# Patient Record
Sex: FEMALE | Race: WHITE | URBAN - METROPOLITAN AREA
[De-identification: names, ages, dates, MRNs, and addresses within clinical notes are randomized per-mention and may not be internally consistent; named-entity substitution may affect disease eponyms.]

---

## 2017-05-15 ENCOUNTER — OFFICE VISIT (OUTPATIENT)
Dept: OBGYN | Facility: CLINIC | Age: 22
End: 2017-05-15
Payer: COMMERCIAL

## 2017-05-15 VITALS
BODY MASS INDEX: 18.37 KG/M2 | HEIGHT: 69 IN | DIASTOLIC BLOOD PRESSURE: 60 MMHG | SYSTOLIC BLOOD PRESSURE: 106 MMHG | WEIGHT: 124 LBS

## 2017-05-15 DIAGNOSIS — N94.6 DYSMENORRHEA: ICD-10-CM

## 2017-05-15 DIAGNOSIS — N92.6 IRREGULAR MENSES: Primary | ICD-10-CM

## 2017-05-15 PROCEDURE — 99202 OFFICE O/P NEW SF 15 MIN: CPT | Performed by: OBSTETRICS & GYNECOLOGY

## 2017-05-15 RX ORDER — NORETHINDRONE ACETATE AND ETHINYL ESTRADIOL 1MG-20(21)
1 KIT ORAL DAILY
Qty: 84 TABLET | Refills: 3 | Status: SHIPPED | OUTPATIENT
Start: 2017-05-15 | End: 2017-11-22

## 2017-05-15 ASSESSMENT — ANXIETY QUESTIONNAIRES
6. BECOMING EASILY ANNOYED OR IRRITABLE: NOT AT ALL
7. FEELING AFRAID AS IF SOMETHING AWFUL MIGHT HAPPEN: NOT AT ALL
GAD7 TOTAL SCORE: 0
2. NOT BEING ABLE TO STOP OR CONTROL WORRYING: NOT AT ALL
3. WORRYING TOO MUCH ABOUT DIFFERENT THINGS: NOT AT ALL
1. FEELING NERVOUS, ANXIOUS, OR ON EDGE: NOT AT ALL
5. BEING SO RESTLESS THAT IT IS HARD TO SIT STILL: NOT AT ALL
IF YOU CHECKED OFF ANY PROBLEMS ON THIS QUESTIONNAIRE, HOW DIFFICULT HAVE THESE PROBLEMS MADE IT FOR YOU TO DO YOUR WORK, TAKE CARE OF THINGS AT HOME, OR GET ALONG WITH OTHER PEOPLE: NOT DIFFICULT AT ALL

## 2017-05-15 ASSESSMENT — PATIENT HEALTH QUESTIONNAIRE - PHQ9: 5. POOR APPETITE OR OVEREATING: NOT AT ALL

## 2017-05-15 NOTE — MR AVS SNAPSHOT
"              After Visit Summary   5/15/2017    Maude Han    MRN: [id number]           Patient Information     Date Of Birth          1995        Visit Information        Provider Department      5/15/2017 9:00 AM Deysi Montoya MD [facility]        Today's Diagnoses     Irregular menses    -  1    Dysmenorrhea           Follow-ups after your visit        Who to contact     If you have questions or need follow up information about today's clinic visit or your schedule please contact [facility] directly at [phone].  Normal or non-critical lab and imaging results will be communicated to you by [employer], letter or phone within 4 business days after the clinic has received the results. If you do not hear from us within 7 days, please contact the clinic through Dreamzer Gamest or phone. If you have a critical or abnormal lab result, we will notify you by phone as soon as possible.  Submit refill requests through Enbridge or call your pharmacy and they will forward the refill request to us. Please allow 3 business days for your refill to be completed.          Additional Information About Your Visit        MyChart Information     Enbridge lets you send messages to your doctor, view your test results, renew your prescriptions, schedule appointments and more. To sign up, go to [email or web address] . Click on \"Log in\" on the left side of the screen, which will take you to the Welcome page. Then click on \"Sign up Now\" on the right side of the page.     You will be asked to enter the access code listed below, as well as some personal information. Please follow the directions to create your username and password.     Your access code is: [email or web address]  Expires: 2017  9:33 AM     Your access code will  in 90 days. If you need help or a new code, please call your [facility] or [phone].        Care EveryWhere ID     This is your Care EveryWhere ID. This could be " "used by other organizations to access your [facility] medical records  [id number]        Your Vitals Were     Height Last Period BMI (Body Mass Index)             5' 9\" (1.753 m) 05/11/2017 (Exact Date) 18.31 kg/m2          Blood Pressure from Last 3 Encounters:   05/15/17 106/60   11/28/12 100/60    Weight from Last 3 Encounters:   05/15/17 124 lb (56.2 kg)   11/28/12 118 lb (53.5 kg) (46 %)*     * Growth percentiles are based on [facility] 2-20 Years data.              Today, you had the following     No orders found for display         Today's Medication Changes          These changes are accurate as of: 5/15/17  9:33 AM.  If you have any questions, ask your nurse or doctor.               Stop taking these medicines if you haven't already. Please contact your care team if you have questions.     azithromycin 500 MG tablet   Commonly known as:  ZITHROMAX   Stopped by:  Deysi Montoya MD           chloroquine 500 MG tablet   Commonly known as:  ARALEN   Stopped by:  Deysi Montoya MD           typhoid CR capsule   Commonly known as:  VIVOTIF   Stopped by:  Deysi Montoya MD                    Primary Care Provider    None Specified       No primary provider on file.        Thank you!     Thank you for choosing [facility] [place]  for your care. Our goal is always to provide you with excellent care. Hearing back from our patients is one way we can continue to improve our services. Please take a few minutes to complete the written survey that you may receive in the mail after your visit with us. Thank you!             Your Updated Medication List - Protect others around you: Learn how to safely use, store and throw away your medicines at www.disposemymeds.org.      Notice  As of 5/15/2017  9:33 AM    You have not been prescribed any medications.      "

## 2017-05-15 NOTE — PROGRESS NOTES
SUBJECTIVE:                                                   Maude Han is a 21 year old female who presents to clinic today for the following health issue(s):  Patient presents with:  Abnormal Bleeding Problem: painful and irregular periods      Additional information: NA    HPI:  Patient is daughter of one of my patient's. Home from college from [employer] for the summer so made her an appointment to discuss her periods.  Menarche at age 14. Normal development. Periods were regular initially and now are more irregular than ever. Usually further apart than a month but sometimes will come a whole week early. Sometimes has tons of PMS sx and sometimes none. Most of the PMS starts the day of the period and not before so has no warning when it's coming which is frustrating and interfering in her day.  Bad menstrual cramps. Almost always the day of the period. Sometimes the day before. Has bad breakouts with the period and sometimes lots of food cravings.  No BF right now. Not s.a and never has been.  Would like to start on ocps for regularity and to improve cramping.   Doing lots of travel this summer and would like to have things under control by then.  Patient played sports in HS but not since going to college. When home will exercise 2x/week with her parents but while at school doesn't really exercise at all.  Her BMi today is 18. Was 18 when she was at a [facility] last.    Patient's last menstrual period was 05/11/2017 (exact date)..   Patient is not sexually active, No obstetric history on file..  Using none for contraception.    reports that she has never smoked. She has never used smokeless tobacco.    STD testing offered?  Declined    Health maintenance updated:  yes    Today's PHQ-2 Score: No flowsheet data found.  Today's PHQ-9 Score:   PHQ-9 SCORE 5/15/2017   Total Score 0     Today's MIKE-7 Score:   MIKE-7 SCORE 5/15/2017   Total Score 0       Problem list and histories reviewed & adjusted, as  "indicated.  Additional history: as documented.    Patient Active Problem List   Diagnosis   (none) - all problems resolved or deleted     Past Surgical History:   Procedure Laterality Date     NO HISTORY OF SURGERY        Social History   Substance Use Topics     Smoking status: Never Smoker     Smokeless tobacco: Never Used     Alcohol use Yes      Problem (# of Occurrences) Relation (Name,Age of Onset)    CANCER (1) Maternal Grandfather    DIABETES (1) Brother    Hypertension (1) Maternal Grandfather            Current Outpatient Prescriptions   Medication Sig     norethindrone-ethinyl estradiol (JUNEL FE 1/20) 1-20 MG-MCG per tablet Take 1 tablet by mouth daily     No current facility-administered medications for this visit.      No Known Allergies    ROS:  12 point review of systems negative other than symptoms noted below.    OBJECTIVE:     /60  Ht 5' 9\" (1.753 m)  Wt 124 lb (56.2 kg)  LMP 05/11/2017 (Exact Date)  BMI 18.31 kg/m2  Body mass index is 18.31 kg/(m^2).    Exam:  Constitutional:  Appearance: Well nourished, well developed alert, in no acute distress     In-Clinic Test Results:  No results found for this or any previous visit (from the past 24 hour(s)).    ASSESSMENT/PLAN:                                                        ICD-10-CM    1. Irregular menses N92.6 norethindrone-ethinyl estradiol (JUNEL FE 1/20) 1-20 MG-MCG per tablet   2. Dysmenorrhea N94.6 norethindrone-ethinyl estradiol (JUNEL FE 1/20) 1-20 MG-MCG per tablet         Patient has normal BMI but somewhat low and that may be causing irregular menses. Likely having anovulatory bleeding. ocps could definitely help with that and the cramping. Discussed different types of birth control and the r/b/a/se/expected outcomes.  Will start with low dose ocps and start today as her LMP was 5/11. If any issues will call and can change by phone. If doing well on them will refill for one year and then next year can do an annual with pap " and then refill from there.  Spent 25 minutes with patient all of which was in counseling time.    Deysi Montoya MD  [facility]

## 2017-05-16 ASSESSMENT — ANXIETY QUESTIONNAIRES: GAD7 TOTAL SCORE: 0

## 2017-05-16 ASSESSMENT — PATIENT HEALTH QUESTIONNAIRE - PHQ9: SUM OF ALL RESPONSES TO PHQ QUESTIONS 1-9: 0

## 2017-09-06 ENCOUNTER — TELEPHONE (OUTPATIENT)
Dept: OBGYN | Facility: CLINIC | Age: 22
End: 2017-09-06

## 2017-11-22 ENCOUNTER — OFFICE VISIT (OUTPATIENT)
Dept: OBGYN | Facility: CLINIC | Age: 22
End: 2017-11-22
Payer: COMMERCIAL

## 2017-11-22 VITALS
DIASTOLIC BLOOD PRESSURE: 70 MMHG | WEIGHT: 127.2 LBS | BODY MASS INDEX: 18.84 KG/M2 | HEART RATE: 66 BPM | HEIGHT: 69 IN | SYSTOLIC BLOOD PRESSURE: 112 MMHG

## 2017-11-22 DIAGNOSIS — Z30.41 ORAL CONTRACEPTIVE PILL SURVEILLANCE: ICD-10-CM

## 2017-11-22 DIAGNOSIS — N94.6 DYSMENORRHEA: ICD-10-CM

## 2017-11-22 DIAGNOSIS — Z01.419 ENCOUNTER FOR GYNECOLOGICAL EXAMINATION WITHOUT ABNORMAL FINDING: Primary | ICD-10-CM

## 2017-11-22 DIAGNOSIS — Z23 NEED FOR PROPHYLACTIC VACCINATION AND INOCULATION AGAINST INFLUENZA: ICD-10-CM

## 2017-11-22 PROCEDURE — 99395 PREV VISIT EST AGE 18-39: CPT | Mod: 25 | Performed by: OBSTETRICS & GYNECOLOGY

## 2017-11-22 PROCEDURE — 90686 IIV4 VACC NO PRSV 0.5 ML IM: CPT | Performed by: OBSTETRICS & GYNECOLOGY

## 2017-11-22 PROCEDURE — G0145 SCR C/V CYTO,THINLAYER,RESCR: HCPCS | Performed by: OBSTETRICS & GYNECOLOGY

## 2017-11-22 PROCEDURE — 90471 IMMUNIZATION ADMIN: CPT | Performed by: OBSTETRICS & GYNECOLOGY

## 2017-11-22 RX ORDER — NORETHINDRONE ACETATE AND ETHINYL ESTRADIOL 1MG-20(21)
1 KIT ORAL DAILY
Qty: 84 TABLET | Refills: 4 | Status: SHIPPED | OUTPATIENT
Start: 2017-11-22 | End: 2017-12-19 | Stop reason: ALTCHOICE

## 2017-11-22 ASSESSMENT — ANXIETY QUESTIONNAIRES
5. BEING SO RESTLESS THAT IT IS HARD TO SIT STILL: NOT AT ALL
IF YOU CHECKED OFF ANY PROBLEMS ON THIS QUESTIONNAIRE, HOW DIFFICULT HAVE THESE PROBLEMS MADE IT FOR YOU TO DO YOUR WORK, TAKE CARE OF THINGS AT HOME, OR GET ALONG WITH OTHER PEOPLE: NOT DIFFICULT AT ALL
6. BECOMING EASILY ANNOYED OR IRRITABLE: NOT AT ALL
3. WORRYING TOO MUCH ABOUT DIFFERENT THINGS: NOT AT ALL
7. FEELING AFRAID AS IF SOMETHING AWFUL MIGHT HAPPEN: NOT AT ALL
1. FEELING NERVOUS, ANXIOUS, OR ON EDGE: NOT AT ALL
2. NOT BEING ABLE TO STOP OR CONTROL WORRYING: NOT AT ALL
GAD7 TOTAL SCORE: 0

## 2017-11-22 ASSESSMENT — PATIENT HEALTH QUESTIONNAIRE - PHQ9
5. POOR APPETITE OR OVEREATING: NOT AT ALL
SUM OF ALL RESPONSES TO PHQ QUESTIONS 1-9: 2

## 2017-11-22 NOTE — LETTER
November 30, 2017      Maude Han  [id number]  [place] [address]    Dear ,      I am happy to inform you that your recent cervical cancer screening test (PAP smear) was normal.      Preventative screenings such as this help to ensure your health for years to come. You should repeat a pap smear in 3 years, unless otherwise directed.      You will still need to return to the clinic every year for your annual exam and other preventive tests.     Please contact the clinic at [phone] if you have further questions.       Sincerely,      Deysi Montoya MD/leana

## 2017-11-22 NOTE — MR AVS SNAPSHOT
"              After Visit Summary   2017    Maude Han    MRN: [id number]           Patient Information     Date Of Birth          1995        Visit Information        Provider Department      2017 10:30 AM Deysi Montoya MD [facility]        Today's Diagnoses     Encounter for gynecological examination without abnormal finding    -  1    Dysmenorrhea        Oral contraceptive pill surveillance        Need for prophylactic vaccination and inoculation against influenza           Follow-ups after your visit        Who to contact     If you have questions or need follow up information about today's clinic visit or your schedule please contact [facility] directly at [phone].  Normal or non-critical lab and imaging results will be communicated to you by userfoxhart, letter or phone within 4 business days after the clinic has received the results. If you do not hear from us within 7 days, please contact the clinic through userfoxhart or phone. If you have a critical or abnormal lab result, we will notify you by phone as soon as possible.  Submit refill requests through Your [employer] or call your pharmacy and they will forward the refill request to us. Please allow 3 business days for your refill to be completed.          Additional Information About Your Visit        MyChart Information     [employer] lets you send messages to your doctor, view your test results, renew your prescriptions, schedule appointments and more. To sign up, go to [email or web address] . Click on \"Log in\" on the left side of the screen, which will take you to the Welcome page. Then click on \"Sign up Now\" on the right side of the page.     You will be asked to enter the access code listed below, as well as some personal information. Please follow the directions to create your username and password.     Your access code is: [email or web address]  Expires: 2018  8:24 PM     Your access code will  " "in 90 days. If you need help or a new code, please call your [facility] clinic or [phone].        Care EveryWhere ID     This is your Care EveryWhere ID. This could be used by other organizations to access your [facility] medical records  [id number]        Your Vitals Were     Pulse Height Last Period BMI (Body Mass Index)          66 5' 9\" (1.753 m) (LMP Unknown) 18.78 kg/m2         Blood Pressure from Last 3 Encounters:   11/22/17 112/70   05/15/17 106/60   11/28/12 100/60    Weight from Last 3 Encounters:   11/22/17 127 lb 3.2 oz (57.7 kg)   05/15/17 124 lb (56.2 kg)   11/28/12 118 lb (53.5 kg) (46 %)*     * Growth percentiles are based on [facility] 2-20 Years data.              We Performed the Following     FLU VAC, SPLIT VIRUS IM > 3 YO (QUADRIVALENT) [00395]     Pap imaged thin layer screen only - recommended age 21 - 24 years     Vaccine Administration, Initial [69119]          Where to get your medicines      These medications were sent to [id number] - [place], [place] - [address] AT [facility] [address]  [address] [place] [address]    Hours:  24-hours Phone:  [phone]     norethindrone-ethinyl estradiol 1-20 MG-MCG per tablet          Primary Care Provider Office Phone # Fax #    [facility] [phone]       [place] [address]  [facility] [address]  [facility] [address]        Equal Access to Services     DAVID ROBERTSON AH: Hadii helen Macias, waclaritza waddell, qaybta niyahallyric ricks. So [facility] [phone].    ATENCIÓN: Si habla español, tiene a silva disposición servicios gratuitos de asistencia lingüística. Dilma goddard [phone].    We comply with applicable federal civil rights laws and [place] laws. We do not discriminate on the basis of race, color, national origin, age, disability, sex, sexual orientation, or gender identity.            Thank you!     Thank you " for choosing [facility] [place]  for your care. Our goal is always to provide you with excellent care. Hearing back from our patients is one way we can continue to improve our services. Please take a few minutes to complete the written survey that you may receive in the mail after your visit with us. Thank you!             Your Updated Medication List - Protect others around you: Learn how to safely use, store and throw away your medicines at www.disposemymeds.org.          This list is accurate as of: 11/22/17  8:24 PM.  Always use your most recent med list.                   Brand Name Dispense Instructions for use Diagnosis    norethindrone-ethinyl estradiol 1-20 MG-MCG per tablet    JUNEL FE 1/20    84 tablet    Take 1 tablet by mouth daily    Dysmenorrhea, Oral contraceptive pill surveillance

## 2017-11-22 NOTE — PROGRESS NOTES
Maude is a 22 year old  female who presents for annual exam.     Besides routine health maintenance,  she would like to discuss birthcontrol side effects.    HPI:  No primary care provider on file..    Patient is doing great. Still in school in [employer] but home for [employer].   Has had a BF for 1 year now. He's been sexually active before. She never has been. Getting to point in relationship where thinks they likely will soon. Isn't sure if he's ever had STD testing.  Patient was having very bad cramps and irregular periods. Put her on microgestin  last year. Was getting a normal period for the first part of the year. Then in summer had a day that woke her up out of sleep with severe sharp RLQ pain. Lasted a few hours and then went away and then got a period about 1-2 days later. However that was in the 2nd or 3rd week of her pill pack. After a few days it stopped and then never got her period when was supposed to and now hasn't had a period in her sugar pill week for a few months. Loves it and is perfectly fine with it as long as knows it's ok  Wondering if certain ocps are good for certain side effect profiles is there one that is more or less effective for contraception in case she should become s.a  Her [place] is from [place]. [place] and isn't opposed to living in [place] when they're both done with college.      GYNECOLOGIC HISTORY:    No LMP recorded (lmp unknown). Patient is not currently having periods (Reason: Irregular Periods).  Her current contraception method is: oral contraceptives.  She  reports that she has never smoked. She has never used smokeless tobacco.      Patient is not sexually active.  STD testing offered?  Declined  Last PHQ-9 score on record =   PHQ-9 SCORE 2017   Total Score 2     Last GAD7 score on record =   MIKE-7 SCORE 2017   Total Score 0     Alcohol Score = 2    HEALTH MAINTENANCE:  Cholesterol: (No results found  "for: CHOL   Last Mammo: NA, not due until age 40  Pap: NA  Colonoscopy: NA, not due until age 50  Dexa: Never    Health maintenance updated:  yes    HISTORY:  Obstetric History       T0      L0     SAB0   TAB0   Ectopic0   Multiple0   Live Births0           Patient Active Problem List   Diagnosis   (none) - all problems resolved or deleted     Past Surgical History:   Procedure Laterality Date     NO HISTORY OF SURGERY        Social History   Substance Use Topics     Smoking status: Never Smoker     Smokeless tobacco: Never Used     Alcohol use Yes      Problem (# of Occurrences) Relation (Name,Age of Onset)    CANCER (1) Maternal Grandfather    DIABETES (1) Brother    Hypertension (1) Maternal Grandfather            Current Outpatient Prescriptions   Medication Sig     norethindrone-ethinyl estradiol (JUNEL FE 1/20) 1-20 MG-MCG per tablet Take 1 tablet by mouth daily     [DISCONTINUED] norethindrone-ethinyl estradiol (JUNEL FE 1/20) 1-20 MG-MCG per tablet Take 1 tablet by mouth daily     No current facility-administered medications for this visit.      No Known Allergies    Past medical, surgical, social and family histories were reviewed and updated in EPIC.    ROS:   12 point review of systems negative other than symptoms noted below.  Respiratory: Cough  Genitourinary: No Periods    EXAM:  /70  Pulse 66  Ht 5' 9\" (1.753 m)  Wt 127 lb 3.2 oz (57.7 kg)  LMP  (LMP Unknown)  BMI 18.78 kg/m2   BMI: Body mass index is 18.78 kg/(m^2).    PHYSICAL EXAM:  Constitutional:  Appearance: Well nourished, well developed, alert, in no acute distress  Neck:  Lymph Nodes:  No lymphadenopathy present    Thyroid:  Gland size normal, nontender, no nodules or masses present  on palpation  Chest:  Respiratory Effort:  Breathing unlabored  Cardiovascular:    Heart: Auscultation:  Regular rate, normal rhythm, no murmurs present  Breasts: Palpation of Breasts and Axillae:  No masses present on palpation, no " breast tenderness. and No nodularity, asymmetry or nipple discharge bilaterally.  Gastrointestinal:   Abdominal Examination:  Abdomen nontender to palpation, tone normal without rigidity or guarding, no masses present, umbilicus without lesions   Liver and Spleen:  No hepatomegaly present, liver nontender to palpation    Hernias:  No hernias present  Lymphatic: Lymph Nodes:  No other lymphadenopathy present  Skin:  General Inspection:  No rashes present, no lesions present, no areas of  discoloration    Genitalia and Groin:  No rashes present, no lesions present, no areas of  discoloration, no masses present  Neurologic/Psychiatric:    Mental Status:  Oriented X3     Pelvic Exam:  External Genitalia:     Normal appearance for age, no discharge present, no tenderness present, no inflammatory lesions present, color normal  Vagina:     Normal vaginal vault without central or paravaginal defects, no discharge present, no inflammatory lesions present, no masses present  Bladder:     Nontender to palpation  Urethra:   Urethral Body:  Urethra palpation normal, urethra structural support normal   Urethral Meatus:  No erythema or lesions present  Cervix:     Appearance healthy, no lesions present, nontender to palpation, no bleeding present  Uterus:     Uterus: firm, normal sized and nontender, deviated left in position.   Adnexa:     No adnexal tenderness present, no adnexal masses present  Perineum:     Perineum within normal limits, no evidence of trauma, no rashes or skin lesions present  Anus:     Anus within normal limits, no hemorrhoids present  Inguinal Lymph Nodes:     No lymphadenopathy present  Pubic Hair:     Normal pubic hair distribution for age  Genitalia and Groin:     No rashes present, no lesions present, no areas of discoloration, no masses present      COUNSELING:   Reviewed preventive health counseling, as reflected in patient instructions  Special attention given to:        Contraception    BMI: Body  mass index is 18.78 kg/(m^2).        ASSESSMENT:  22 year old female with satisfactory annual exam.  ICD-10-CM    1. Encounter for gynecological examination without abnormal finding Z01.419 Pap imaged thin layer screen only - recommended age 21 - 24 years   2. Dysmenorrhea N94.6 norethindrone-ethinyl estradiol (JUNEL FE 1/20) 1-20 MG-MCG per tablet   3. Oral contraceptive pill surveillance Z30.41 norethindrone-ethinyl estradiol (JUNEL FE 1/20) 1-20 MG-MCG per tablet   4. Need for prophylactic vaccination and inoculation against influenza Z23 FLU VAC, SPLIT VIRUS IM > 3 YO (QUADRIVALENT) [54145]     Vaccine Administration, Initial [51432]       PLAN:  Pap done today for first time  Patient declines STD testing since virginal. Did encourage her to ask her BF to do STD testing before they have I.C and to use condoms regardless of being on ocps  Reassured that amenorrhea on this specific ocp is very common and completely safe. The sharp RLQ pain is unclear, could have been a cyst, could have been GI and unrelated to the BTB midpack that month. As long as BTB is gone or minimal and amenorrhea is fine with her than safe to continue. If BTB returns and is heavy or frequent can call for a new ocp. Reassured that regardless of type of ocp they are all effective at contraception  Flu shot today    Deysi Montoya MD  Injectable Influenza Immunization Documentation    1.  Is the person to be vaccinated sick today?   No    2. Does the person to be vaccinated have an allergy to a component   of the vaccine?   No  Egg Allergy Algorithm Link    3. Has the person to be vaccinated ever had a serious reaction   to influenza vaccine in the past?   No    4. Has the person to be vaccinated ever had Guillain-Barré syndrome?   No    Form completed by Vanesa Roa MA

## 2017-11-23 ASSESSMENT — ANXIETY QUESTIONNAIRES: GAD7 TOTAL SCORE: 0

## 2017-11-27 LAB
COPATH REPORT: NORMAL
PAP: NORMAL

## 2017-12-18 ENCOUNTER — TELEPHONE (OUTPATIENT)
Dept: OBGYN | Facility: CLINIC | Age: 22
End: 2017-12-18

## 2017-12-18 DIAGNOSIS — Z30.41 USES ORAL CONTRACEPTION: ICD-10-CM

## 2017-12-18 DIAGNOSIS — N94.6 DYSMENORRHEA: Primary | ICD-10-CM

## 2017-12-18 NOTE — TELEPHONE ENCOUNTER
Reason for Call:  Other call back    Detailed comments: patient has a question about switching her Birth Control meds. She saw Dr. Lora's in November.    Phone Number Patient can be reached at: Cell number on file:    Telephone Information:   Mobile [phone]       Best Time: today    Can we leave a detailed message on this number? YES    Call taken on 12/18/2017 at 11:36 AM by Karen Tripp

## 2017-12-19 RX ORDER — DROSPIRENONE AND ETHINYL ESTRADIOL 0.03MG-3MG
1 KIT ORAL DAILY
Qty: 84 TABLET | Refills: 3 | Status: SHIPPED | OUTPATIENT
Start: 2017-12-19 | End: 2017-12-19

## 2017-12-19 RX ORDER — DROSPIRENONE AND ETHINYL ESTRADIOL 0.02-3(28)
1 KIT ORAL DAILY
Qty: 84 TABLET | Refills: 3 | Status: SHIPPED | OUTPATIENT
Start: 2017-12-19 | End: 2018-11-07

## 2017-12-19 NOTE — TELEPHONE ENCOUNTER
Pt calling back indicating she forgot to mention that ever since she has started the Junel Fe 1/20 her skin has gotten progressively worse and this cycle when she got her period she woke up with cramps-they woke her up, not severe, and she has never had that happen before, wondering if that's normal. Pt is currently on day 2 of her sugar pills and period (as sometimes with this Rx in the past she has not gotten a flow). Asked if stress could be playing a part and pt notes that she was stressed before she was on the Rx and her skin was not like this. Pt aware Dr. Montoya in surgery today and back tomorrow. Routing to Dr. Montoya to review/advise.

## 2017-12-19 NOTE — TELEPHONE ENCOUNTER
All ocps usually help decrease acne. Some better than other for specific people and some specifically have FDA approval for this.  It comes down to weighing pros/cons. We can switch ocps to something better for acne like trisprintec or gayla but then may have a normal period every month and cramps may or may not be present. Always potential for other side effects though too.  Or could choose to stay on current ocp and give it some time to see if acne is just a fluke dn if cramps just occurred b/c had a cycle this month and then most months the amenorrhea and lack of cramps are good enough to deal with acne. Up to her how she wants to proceed.

## 2017-12-19 NOTE — TELEPHONE ENCOUNTER
"POC note 11/22/17: \"Reassured that amenorrhea on this specific ocp is very common and completely safe. The sharp RLQ pain is unclear, could have been a cyst, could have been GI and unrelated to the BTB midpack that month. As long as BTB is gone or minimal and amenorrhea is fine with her than safe to continue. If BTB returns and is heavy or frequent can call for a new ocp. \"  LMTCB to discuss.   "

## 2017-12-19 NOTE — TELEPHONE ENCOUNTER
Pt informed. Pt states she is really having a difficult time with the acne and is willing to try a different OCP. Understands she will need to have normal period every month. Rx sent for [place], by mistake. Called pharmacy and spoke to pharmacist who will cancel Rx. Correct Rx sent for [facility].

## 2018-11-07 DIAGNOSIS — N94.6 DYSMENORRHEA: ICD-10-CM

## 2018-11-07 DIAGNOSIS — Z30.41 USES ORAL CONTRACEPTION: ICD-10-CM

## 2018-11-07 RX ORDER — DROSPIRENONE AND ETHINYL ESTRADIOL TABLETS 0.02-3(28)
KIT ORAL
Qty: 28 TABLET | Refills: 0 | Status: SHIPPED | OUTPATIENT
Start: 2018-11-07 | End: 2018-12-20

## 2018-12-20 DIAGNOSIS — N94.6 DYSMENORRHEA: ICD-10-CM

## 2018-12-20 DIAGNOSIS — Z30.41 USES ORAL CONTRACEPTION: ICD-10-CM

## 2018-12-20 RX ORDER — DROSPIRENONE AND ETHINYL ESTRADIOL TABLETS 0.02-3(28)
KIT ORAL
Qty: 28 TABLET | Refills: 0 | Status: SHIPPED | OUTPATIENT
Start: 2018-12-20 | End: 2019-07-31

## 2018-12-20 NOTE — TELEPHONE ENCOUNTER
"Requested Prescriptions   Pending Prescriptions Disp Refills     LORYNA 3-0.02 MG tablet [Pharmacy Med Name: LORYNA 3MG/0.02MG TABLETS 28S] 28 tablet 0     Sig: TAKE 1 TABLET BY MOUTH EVERY DAY    Contraceptives Protocol Failed - 12/20/2018 11:19 AM       Failed - Recent (12 mo) or future (30 days) visit within the authorizing provider's specialty    Patient had office visit in the last 12 months or has a visit in the next 30 days with authorizing provider or within the authorizing provider's specialty.  See \"Patient Info\" tab in inbasket, or \"Choose Columns\" in Meds & Orders section of the refill encounter.             Passed - Patient is not a current smoker if age is 35 or older       Passed - No active pregnancy on record       Passed - No positive pregnancy test in past 12 months        Spoke with pt, she will call back and make an appt. One month refill approved.  Eliza Robles RN on 12/20/2018 at 11:25 AM    "

## 2019-07-30 NOTE — PROGRESS NOTES
Maude is a 24 year old  female who presents for annual exam.     Besides routine health maintenance, she has no other health concerns today .    HPI:  The patient's PCP is none.    Patient hasnt been in to see me since . Was going to college in [place] [facility].  Was on microgestin and was amenorrheic for a long time. Periods then did start to come back. Unclear if ocp was changed bc of a yeast infection or purely bc [facility]'s [facility], but patient is now on loryna. Doing great on it. No cramps, periods very light and usually every other month and not every month. Happy with it. Has heard a lot about IUDs so wondering if she should consider that or not  Patient graduated from college with media/communications degree and is now going to live in [place] where her fiance is from. Looking for jobs. He graduated as well and now going to apply to law schools so may have to move in a year. No plans for kids  Patient wasn't s.a yet when I saw her last and now has been and no issues. Agrees to GC/C but declines need for other testing. Zeus is her first partner  Got gardasil 4 x2 . Discussed gardasil 9 with pros/cons and patient would actually like to do it      GYNECOLOGIC HISTORY:    No LMP recorded. (Menstrual status: Birth Control).  Her current contraception method is: oral contraceptives.  She  reports that she has never smoked. She has never used smokeless tobacco.    Patient is sexually active.  STD testing offered?  Accepted  Last PHQ-9 score on record =   PHQ-9 SCORE 2019   PHQ-9 Total Score 0     Last GAD7 score on record =   MIKE-7 SCORE 2019   Total Score 0     Alcohol Score = 1    HEALTH MAINTENANCE:  Cholesterol: N/A  Last Mammo: never, Next Mammo: age 41  Pap:   Lab Results   Component Value Date    PAP NIL 2017      Colonoscopy:  never, Next Colonoscopy: age 51.  Dexa:  never    Health  "maintenance updated:  yes    HISTORY:  OB History    Para Term  AB Living   0 0 0 0 0 0   SAB TAB Ectopic Multiple Live Births   0 0 0 0 0       Patient Active Problem List   Diagnosis   (none) - all problems resolved or deleted     Past Surgical History:   Procedure Laterality Date     NO HISTORY OF SURGERY        Social History     Tobacco Use     Smoking status: Never Smoker     Smokeless tobacco: Never Used   Substance Use Topics     Alcohol use: Yes      Problem (# of Occurrences) Relation (Name,Age of Onset)    Cancer (1) Maternal Grandfather    Diabetes (1) Brother    Hypertension (1) Maternal Grandfather            Current Outpatient Medications   Medication Sig     drospirenone-ethinyl estradiol (LORYNA) 3-0.02 MG tablet Take 1 tablet by mouth daily     No current facility-administered medications for this visit.      No Known Allergies    Past medical, surgical, social and family histories were reviewed and updated in EPIC.    ROS:   12 point review of systems negative other than symptoms noted below.    EXAM:  /64 (BP Location: Right arm, Patient Position: Sitting, Cuff Size: Adult Regular)   Pulse 80   Ht 1.765 m (5' 9.5\")   Wt 57.5 kg (126 lb 12.8 oz)   Breastfeeding? No   BMI 18.46 kg/m     BMI: Body mass index is 18.46 kg/m .    PHYSICAL EXAM:  Constitutional:  Appearance: Well nourished, well developed, alert, in no acute distress  Neck:  Lymph Nodes:  No lymphadenopathy present    Thyroid:  Gland size normal, nontender, no nodules or masses present  on palpation  Chest:  Respiratory Effort:  Breathing unlabored  Cardiovascular:    Heart: Auscultation:  Regular rate, normal rhythm, no murmurs present  Breasts: Palpation of Breasts and Axillae:  No masses present on palpation, no breast tenderness., No nodularity, asymmetry or nipple discharge bilaterally. and IMPLANTS BILATERALLY  Gastrointestinal:   Abdominal Examination:  Abdomen nontender to palpation, tone normal without " rigidity or guarding, no masses present, umbilicus without lesions   Liver and Spleen:  No hepatomegaly present, liver nontender to palpation    Hernias:  No hernias present  Lymphatic: Lymph Nodes:  No other lymphadenopathy present  Skin:  General Inspection:  No rashes present, no lesions present, no areas of  discoloration    Genitalia and Groin:  No rashes present, no lesions present, no areas of  discoloration, no masses present  Neurologic/Psychiatric:    Mental Status:  Oriented X3     Pelvic Exam:  External Genitalia:     Normal appearance for age, no discharge present, no tenderness present, no inflammatory lesions present, color normal  Vagina:     Normal vaginal vault without central or paravaginal defects, no discharge present, no inflammatory lesions present, no masses present  Bladder:     Nontender to palpation  Urethra:   Urethral Body:  Urethra palpation normal, urethra structural support normal   Urethral Meatus:  No erythema or lesions present  Cervix:     Appearance healthy, no lesions present, nontender to palpation, no bleeding present  Uterus:     Uterus: firm, normal sized and nontender, anteverted in position.   Adnexa:     No adnexal tenderness present, no adnexal masses present  Perineum:     Perineum within normal limits, no evidence of trauma, no rashes or skin lesions present  Anus:     Anus within normal limits, no hemorrhoids present  Inguinal Lymph Nodes:     No lymphadenopathy present  Pubic Hair:     Normal pubic hair distribution for age  Genitalia and Groin:     No rashes present, no lesions present, no areas of discoloration, no masses present      COUNSELING:   Reviewed preventive health counseling, as reflected in patient instructions  Special attention given to:        Contraception    BMI: Body mass index is 18.46 kg/m .      ASSESSMENT:  24 year old female with satisfactory annual exam.    ICD-10-CM    1. Encounter for gynecological examination without abnormal finding  Z01.419    2. Dysmenorrhea N94.6 drospirenone-ethinyl estradiol (LORYNA) 3-0.02 MG tablet   3. Uses oral contraception Z30.41 drospirenone-ethinyl estradiol (LORYNA) 3-0.02 MG tablet   4. Screen for STD (sexually transmitted disease) Z11.3 NEISSERIA GONORRHOEA PCR   5. Screening for chlamydial disease Z11.8 CHLAMYDIA TRACHOMATIS PCR       PLAN:  Pap is UTD for another 1.5 yrs  Refill her ocp as working well for her  GC/C testing today  Will give gardasil 9 first shot today. May be able to do her second with us before she goes back to [place] but then will need to establish care in [place] to complete series and for her general gyn care. Will get her some names/recs of people out there.  Discussed pros/cons of IUD and ocp. If patient had issues remembering her ocps or had mood/weight/bleeding issues then could consider IUD for ease. However is taking it regularly and having very minimal bleeding so at this point will just stay on ocps and can always consider IUD in the future    Deysi Montoya MD

## 2019-07-31 ENCOUNTER — OFFICE VISIT (OUTPATIENT)
Dept: OBGYN | Facility: CLINIC | Age: 24
End: 2019-07-31
Payer: COMMERCIAL

## 2019-07-31 VITALS
DIASTOLIC BLOOD PRESSURE: 64 MMHG | SYSTOLIC BLOOD PRESSURE: 118 MMHG | BODY MASS INDEX: 18.15 KG/M2 | HEART RATE: 80 BPM | HEIGHT: 70 IN | WEIGHT: 126.8 LBS

## 2019-07-31 DIAGNOSIS — Z01.419 ENCOUNTER FOR GYNECOLOGICAL EXAMINATION WITHOUT ABNORMAL FINDING: Primary | ICD-10-CM

## 2019-07-31 DIAGNOSIS — Z23 NEED FOR HPV VACCINATION: ICD-10-CM

## 2019-07-31 DIAGNOSIS — N94.6 DYSMENORRHEA: ICD-10-CM

## 2019-07-31 DIAGNOSIS — Z11.8 SCREENING FOR CHLAMYDIAL DISEASE: ICD-10-CM

## 2019-07-31 DIAGNOSIS — Z11.3 SCREEN FOR STD (SEXUALLY TRANSMITTED DISEASE): ICD-10-CM

## 2019-07-31 DIAGNOSIS — Z30.41 USES ORAL CONTRACEPTION: ICD-10-CM

## 2019-07-31 PROCEDURE — 87491 CHLMYD TRACH DNA AMP PROBE: CPT | Performed by: OBSTETRICS & GYNECOLOGY

## 2019-07-31 PROCEDURE — 90651 9VHPV VACCINE 2/3 DOSE IM: CPT | Performed by: OBSTETRICS & GYNECOLOGY

## 2019-07-31 PROCEDURE — 99395 PREV VISIT EST AGE 18-39: CPT | Mod: 25 | Performed by: OBSTETRICS & GYNECOLOGY

## 2019-07-31 PROCEDURE — 87591 N.GONORRHOEAE DNA AMP PROB: CPT | Performed by: OBSTETRICS & GYNECOLOGY

## 2019-07-31 PROCEDURE — 90471 IMMUNIZATION ADMIN: CPT | Performed by: OBSTETRICS & GYNECOLOGY

## 2019-07-31 RX ORDER — DROSPIRENONE AND ETHINYL ESTRADIOL 0.02-3(28)
1 KIT ORAL DAILY
Qty: 84 TABLET | Refills: 4 | Status: SHIPPED | OUTPATIENT
Start: 2019-07-31 | End: 2020-08-14

## 2019-07-31 ASSESSMENT — ANXIETY QUESTIONNAIRES
IF YOU CHECKED OFF ANY PROBLEMS ON THIS QUESTIONNAIRE, HOW DIFFICULT HAVE THESE PROBLEMS MADE IT FOR YOU TO DO YOUR WORK, TAKE CARE OF THINGS AT HOME, OR GET ALONG WITH OTHER PEOPLE: NOT DIFFICULT AT ALL
7. FEELING AFRAID AS IF SOMETHING AWFUL MIGHT HAPPEN: NOT AT ALL
2. NOT BEING ABLE TO STOP OR CONTROL WORRYING: NOT AT ALL
3. WORRYING TOO MUCH ABOUT DIFFERENT THINGS: NOT AT ALL
6. BECOMING EASILY ANNOYED OR IRRITABLE: NOT AT ALL
1. FEELING NERVOUS, ANXIOUS, OR ON EDGE: NOT AT ALL
5. BEING SO RESTLESS THAT IT IS HARD TO SIT STILL: NOT AT ALL
GAD7 TOTAL SCORE: 0

## 2019-07-31 ASSESSMENT — PATIENT HEALTH QUESTIONNAIRE - PHQ9
5. POOR APPETITE OR OVEREATING: NOT AT ALL
SUM OF ALL RESPONSES TO PHQ QUESTIONS 1-9: 0

## 2019-07-31 ASSESSMENT — MIFFLIN-ST. JEOR: SCORE: 1402.47

## 2019-07-31 NOTE — NURSING NOTE
Screening Questionnaire for Adult Immunization    Are you sick today?   No   Do you have allergies to medications, food, a vaccine component or latex?   No   Have you ever had a serious reaction after receiving a vaccination?   No   Do you have a long-term health problem with heart disease, lung disease, asthma, kidney disease, metabolic disease (e.g. diabetes), anemia, or other blood disorder?   No   Do you have cancer, leukemia, HIV/AIDS, or any other immune system problem?   No   In the past 3 months, have you taken medications that affect  your immune system, such as prednisone, other steroids, or anticancer drugs; drugs for the treatment of rheumatoid arthritis, Crohn s disease, or psoriasis; or have you had radiation treatments?   No   Have you had a seizure, or a brain or other nervous system problem?   No   During the past year, have you received a transfusion of blood or blood     products, or been given immune (gamma) globulin or antiviral drug?   No   For women: Are you pregnant or is there a chance you could become        pregnant during the next month?   No   Have you received any vaccinations in the past 4 weeks?   No     Immunization questionnaire answers were all negative.        Per orders of Dr. Montoya, injection of Gardasil 9 given by Maranda Joyce. Patient instructed to remain in clinic for 15 minutes afterwards, and to report any adverse reaction to me immediately.       Screening performed by Maranda Joyce on 7/31/2019 at 11:31 AM.

## 2019-08-01 LAB
C TRACH DNA SPEC QL NAA+PROBE: NEGATIVE
N GONORRHOEA DNA SPEC QL NAA+PROBE: NEGATIVE
SPECIMEN SOURCE: NORMAL
SPECIMEN SOURCE: NORMAL

## 2019-08-01 ASSESSMENT — ANXIETY QUESTIONNAIRES: GAD7 TOTAL SCORE: 0

## 2020-03-01 ENCOUNTER — HEALTH MAINTENANCE LETTER (OUTPATIENT)
Age: 25
End: 2020-03-01

## 2020-08-13 DIAGNOSIS — Z30.41 USES ORAL CONTRACEPTION: ICD-10-CM

## 2020-08-13 DIAGNOSIS — N94.6 DYSMENORRHEA: ICD-10-CM

## 2020-08-13 NOTE — TELEPHONE ENCOUNTER
"Requested Prescriptions   Pending Prescriptions Disp Refills     drospirenone-ethinyl estradiol (LORYNA) 3-0.02 MG tablet 84 tablet 4     Sig: Take 1 tablet by mouth daily       Contraceptives Protocol Failed - 8/13/2020  5:53 PM        Failed - Recent (12 mo) or future (30 days) visit within the authorizing provider's specialty     Patient has had an office visit with the authorizing provider or a provider within the authorizing providers department within the previous 12 mos or has a future within next 30 days. See \"Patient Info\" tab in inbasket, or \"Choose Columns\" in Meds & Orders section of the refill encounter.              Passed - Patient is not a current smoker if age is 35 or older        Passed - Medication is active on med list        Passed - No active pregnancy on record        Passed - No positive pregnancy test in past 12 months           Last Written Prescription Date:  7/31/19  Last Fill Quantity: 84,  # refills: 4   Last office visit: 7/31/2019 with prescribing provider:  Dr Deysi Montoya   Future Office Visit:  none          "

## 2020-08-14 RX ORDER — DROSPIRENONE AND ETHINYL ESTRADIOL 0.02-3(28)
1 KIT ORAL DAILY
Qty: 28 TABLET | Refills: 0 | Status: SHIPPED | OUTPATIENT
Start: 2020-08-14

## 2020-08-14 NOTE — TELEPHONE ENCOUNTER
Medication is being filled for 1 time refill only due to:  Patient needs to be seen because it has been more than one year since last visit.  Mela Lino RN on 8/14/2020 at 9:30 AM

## 2020-09-03 DIAGNOSIS — Z30.41 USES ORAL CONTRACEPTION: ICD-10-CM

## 2020-09-03 DIAGNOSIS — N94.6 DYSMENORRHEA: ICD-10-CM

## 2020-09-03 RX ORDER — DROSPIRENONE AND ETHINYL ESTRADIOL TABLETS 0.02-3(28)
KIT ORAL
Qty: 28 TABLET | Refills: 0 | OUTPATIENT
Start: 2020-09-03

## 2020-09-03 NOTE — TELEPHONE ENCOUNTER
"Requested Prescriptions   Pending Prescriptions Disp Refills     LORYNA 3-0.02 MG tablet [Pharmacy Med Name: LORYNA 3 MG-0.02 MG TABLET] 28 tablet 0     Sig: TAKE 1 TABLET BY MOUTH DAILY NEEDS ANNUAL VISIT FOR FURTHER REFILLS       Contraceptives Protocol Failed - 9/3/2020  8:35 AM        Failed - Recent (12 mo) or future (30 days) visit within the authorizing provider's specialty     Patient has had an office visit with the authorizing provider or a provider within the authorizing providers department within the previous 12 mos or has a future within next 30 days. See \"Patient Info\" tab in inbasket, or \"Choose Columns\" in Meds & Orders section of the refill encounter.              Passed - Patient is not a current smoker if age is 35 or older        Passed - Medication is active on med list        Passed - No active pregnancy on record        Passed - No positive pregnancy test in past 12 months           Pt due for annual, no appt scheduled. Pt already received one month extension. Rx denied.   Eliza Robles RN on 9/3/2020 at 8:55 AM    "

## 2020-12-13 ENCOUNTER — HEALTH MAINTENANCE LETTER (OUTPATIENT)
Age: 25
End: 2020-12-13

## 2021-02-21 ENCOUNTER — HEALTH MAINTENANCE LETTER (OUTPATIENT)
Age: 26
End: 2021-02-21

## 2021-09-26 ENCOUNTER — HEALTH MAINTENANCE LETTER (OUTPATIENT)
Age: 26
End: 2021-09-26

## 2022-01-16 ENCOUNTER — HEALTH MAINTENANCE LETTER (OUTPATIENT)
Age: 27
End: 2022-01-16

## 2023-01-14 ENCOUNTER — HEALTH MAINTENANCE LETTER (OUTPATIENT)
Age: 28
End: 2023-01-14

## 2023-04-23 ENCOUNTER — HEALTH MAINTENANCE LETTER (OUTPATIENT)
Age: 28
End: 2023-04-23